# Patient Record
Sex: FEMALE | Race: WHITE | Employment: PART TIME | URBAN - METROPOLITAN AREA
[De-identification: names, ages, dates, MRNs, and addresses within clinical notes are randomized per-mention and may not be internally consistent; named-entity substitution may affect disease eponyms.]

---

## 2017-03-27 ENCOUNTER — OFFICE VISIT (OUTPATIENT)
Dept: FAMILY MEDICINE CLINIC | Facility: CLINIC | Age: 37
End: 2017-03-27

## 2017-03-27 VITALS
SYSTOLIC BLOOD PRESSURE: 122 MMHG | HEIGHT: 65 IN | WEIGHT: 229 LBS | HEART RATE: 81 BPM | TEMPERATURE: 98 F | DIASTOLIC BLOOD PRESSURE: 60 MMHG | BODY MASS INDEX: 38.15 KG/M2 | RESPIRATION RATE: 12 BRPM | OXYGEN SATURATION: 99 %

## 2017-03-27 DIAGNOSIS — I42.9 CARDIOMYOPATHY, UNSPECIFIED TYPE (HCC): ICD-10-CM

## 2017-03-27 DIAGNOSIS — Z00.00 ROUTINE ADULT HEALTH MAINTENANCE: Primary | ICD-10-CM

## 2017-03-27 DIAGNOSIS — L73.2 HYDRADENITIS: ICD-10-CM

## 2017-03-27 PROCEDURE — 99214 OFFICE O/P EST MOD 30 MIN: CPT | Performed by: FAMILY MEDICINE

## 2017-03-27 RX ORDER — CLINDAMYCIN PHOSPHATE 10 MG/ML
LOTION TOPICAL
Qty: 60 ML | Refills: 1 | Status: SHIPPED | OUTPATIENT
Start: 2017-03-27

## 2017-03-27 NOTE — PROGRESS NOTES
HPI:    Patient ID: Cornelia Prado is a 39year old female. HPI  Patient is here with complaint of her hidradenitis suppurativa starting to act up again in the armpits and below her breast and groin area. She wants to treated before it gets bad.   She has had side effects of them. She does not want stronger medication either. Will also check repeat echocardiogram is she had postpartum cardiomyopathy and had echo done at that time which showed abnormalities.   She feels fine and has dental procedure darci

## 2017-04-05 ENCOUNTER — LAB ENCOUNTER (OUTPATIENT)
Dept: LAB | Age: 37
End: 2017-04-05
Attending: FAMILY MEDICINE
Payer: MEDICAID

## 2017-04-05 DIAGNOSIS — Z00.00 ROUTINE ADULT HEALTH MAINTENANCE: ICD-10-CM

## 2017-04-05 PROCEDURE — 80053 COMPREHEN METABOLIC PANEL: CPT

## 2017-04-05 PROCEDURE — 85025 COMPLETE CBC W/AUTO DIFF WBC: CPT

## 2017-04-05 PROCEDURE — 84443 ASSAY THYROID STIM HORMONE: CPT

## 2017-04-05 PROCEDURE — 84481 FREE ASSAY (FT-3): CPT

## 2017-04-05 PROCEDURE — 84439 ASSAY OF FREE THYROXINE: CPT

## 2017-04-05 PROCEDURE — 80061 LIPID PANEL: CPT

## 2017-04-05 PROCEDURE — 36415 COLL VENOUS BLD VENIPUNCTURE: CPT

## 2017-04-13 ENCOUNTER — HOSPITAL ENCOUNTER (OUTPATIENT)
Dept: CV DIAGNOSTICS | Age: 37
Discharge: HOME | End: 2017-04-13
Attending: FAMILY MEDICINE
Payer: MEDICAID

## 2017-04-13 DIAGNOSIS — I42.9 CARDIOMYOPATHY, UNSPECIFIED TYPE (HCC): ICD-10-CM

## 2017-04-13 PROCEDURE — 93306 TTE W/DOPPLER COMPLETE: CPT

## 2017-04-13 PROCEDURE — 93306 TTE W/DOPPLER COMPLETE: CPT | Performed by: INTERNAL MEDICINE

## 2017-04-18 ENCOUNTER — OFFICE VISIT (OUTPATIENT)
Dept: FAMILY MEDICINE CLINIC | Facility: CLINIC | Age: 37
End: 2017-04-18

## 2017-04-18 VITALS
RESPIRATION RATE: 14 BRPM | SYSTOLIC BLOOD PRESSURE: 120 MMHG | OXYGEN SATURATION: 100 % | HEART RATE: 129 BPM | BODY MASS INDEX: 37 KG/M2 | DIASTOLIC BLOOD PRESSURE: 68 MMHG | WEIGHT: 222 LBS

## 2017-04-18 DIAGNOSIS — E78.00 HIGH CHOLESTEROL: ICD-10-CM

## 2017-04-18 DIAGNOSIS — L73.2 HIDRADENITIS SUPPURATIVA: Primary | ICD-10-CM

## 2017-04-18 DIAGNOSIS — D64.9 ANEMIA, UNSPECIFIED TYPE: ICD-10-CM

## 2017-04-18 PROCEDURE — 99214 OFFICE O/P EST MOD 30 MIN: CPT | Performed by: FAMILY MEDICINE

## 2017-04-18 RX ORDER — FLUCONAZOLE 150 MG/1
150 TABLET ORAL ONCE
Qty: 1 TABLET | Refills: 0 | Status: SHIPPED | OUTPATIENT
Start: 2017-04-18 | End: 2017-04-18

## 2017-04-18 RX ORDER — MINOCYCLINE HYDROCHLORIDE 100 MG/1
100 CAPSULE ORAL 2 TIMES DAILY
Qty: 28 CAPSULE | Refills: 0 | Status: SHIPPED | OUTPATIENT
Start: 2017-04-18 | End: 2017-05-02

## 2017-04-18 NOTE — PROGRESS NOTES
HPI:    Patient ID: Mary Ann Clark is a 39year old female. HPI  Patient is here with complaint of a flareup of the hidradenitis suppurativa in the right left armpit area which is tender and she noticed a cyst formation there underneath the skin.   Nor need to see surgeon to have surgical drainage. Labs reviewed showing hemoglobin slightly low at 10.7. This is consistent with menstrual blood loss with iron deficiency anemia. She will start iron medication over-the-counter once a day.   Recheck CBC and

## 2017-05-15 ENCOUNTER — HOSPITAL ENCOUNTER (EMERGENCY)
Age: 37
Discharge: HOME | End: 2017-05-15
Payer: MEDICAID

## 2017-05-15 ENCOUNTER — APPOINTMENT (OUTPATIENT)
Dept: GENERAL RADIOLOGY | Age: 37
End: 2017-05-15
Payer: MEDICAID

## 2017-05-15 VITALS
OXYGEN SATURATION: 98 % | HEIGHT: 66 IN | TEMPERATURE: 98 F | BODY MASS INDEX: 34.55 KG/M2 | HEART RATE: 69 BPM | RESPIRATION RATE: 16 BRPM | SYSTOLIC BLOOD PRESSURE: 119 MMHG | WEIGHT: 215 LBS | DIASTOLIC BLOOD PRESSURE: 83 MMHG

## 2017-05-15 DIAGNOSIS — S96.912A STRAIN OF FOOT, LEFT, INITIAL ENCOUNTER: Primary | ICD-10-CM

## 2017-05-15 PROCEDURE — 99283 EMERGENCY DEPT VISIT LOW MDM: CPT

## 2017-05-15 PROCEDURE — 73630 X-RAY EXAM OF FOOT: CPT

## 2017-05-15 RX ORDER — MINOCYCLINE HYDROCHLORIDE 50 MG/1
50 CAPSULE ORAL 2 TIMES DAILY
COMMUNITY

## 2017-05-15 NOTE — ED INITIAL ASSESSMENT (HPI)
States dropped jar of spaghetti sauce on left foot, now with pain to left lateral foot and 4th and 5th toes.  Pulses palpated

## 2017-05-15 NOTE — ED PROVIDER NOTES
Patient Seen in: THE [facility] Emergency Department In [place]    History   Patient presents with:  Lower Extremity Injury (musculoskeletal)    Stated Complaint: left foot injury     HPI    This is a 79-year-old female who dropped a jar of spaghetti sauce on 34.72 kg/m2  SpO2 98%  LMP 04/29/2017        Physical Exam  General: Female no respiratory distress  The patient is in no respiratory distress    HEENT: There is no signs of trauma. Oral mucosa is wet.     Lungs: Clear to auscultation without wheezing or r

## 2017-07-18 NOTE — ADDENDUM NOTE
Encounter addended by: Patricia Orozco on: 7/18/2017  7:56 AM<BR>    Actions taken: Letter status changed

## 2021-03-18 NOTE — LETTER
07/18/17        [address] [place] [id number]      Dear Gunnar Client records indicate that you have outstanding lab work and or testing that was ordered for you and has not yet been completed:          CBC, Platelet, No Pharmacist chart review completed for refill of xyandi indicates no medication or significant health changes since last pharmacist counseling. No questions for the pharmacist. Medication shipped to [facility] [id number]   via [employer] for delivery on 3/25.    Joyce Norris   Pharmacy Technician Specialist  [facility]  Phone: [phone]  [email or web address]

## 2023-04-21 NOTE — ADDENDUM NOTE
Addended by: MAGGY ATKINSON on: 4/21/2023 01:57 PM     Modules accepted: Orders     Addended by: Shobha Gomes on: 4/18/2017 05:26 PM     Modules accepted: [facility]

## (undated) NOTE — ED AVS SNAPSHOT
[facility] in [address] [facility]    Phone:  [phone]    Fax:  [phone] [facility]   MRN: [id number]    Department:  [facility] in [place]   Date of Visit coverage for follow-up care and referrals. [address] [facility] [place] [phone]  Pediatric [phone] Emergency Department   [phone]       To Check ER Wait Times:  TEXT 'ERwait' to 11425      Click [email or web address] will be contacted. Please make sure we have your correct phone number before you leave. After you leave, you should follow the attached instructions. I have read and understand the instructions given to me by my caregivers.         24-Hour Pharmacies XR FOOT, COMPLETE (MIN 3 VIEWS), LEFT (CPT=73630) (Final result) Result time:  05/15/17 10:47:28    Final result    Impression:    PROCEDURE:  XR FOOT, COMPLETE (MIN 3 VIEWS), LEFT (CPT=73630)     TECHNIQUE:  AP, oblique, and lateral views were obtained.

## (undated) NOTE — MR AVS SNAPSHOT
[facility] [address] [place] [address] [address] [facility]  [address] [phone]               Thank you for choosing us for your health care visit with Keiry Azul MD.  We are glad to serve you and happy to provide you wit Take 1 tablet (150 mg total) by mouth once. Commonly known as:  DIFLUCAN           Minocycline HCl 100 MG Caps   Take 1 capsule (100 mg total) by mouth 2 (two) times daily.    Commonly known as:  MINOCIN,DYNACIN                Where to Get Your Medication

## (undated) NOTE — MR AVS SNAPSHOT
[facility] [address] [place] [address] [address] [facility]  [phone]               Thank you for choosing us for your health care visit with Alexandra Linda MD.  We are glad to serve you and happy to provide you wit to obtain this authorization for your ordered radiology test.    To schedule an appointment for your radiology test please call Terry Gudino Scheduling   at [phone].          Reason for Today's Visit     Rash           Medical Issues Discuss Make half your plate fruits and vegetables Highly refined, white starches including white bread, rice and pasta   Eat plenty of protein, keep the fat content low Sugars:  sodas and sports drinks, candies and desserts   Eat plenty of low-fat dairy products

## (undated) NOTE — ED AVS SNAPSHOT
[facility] Emergency Department in [address] [facility]    Phone:  [phone]    Fax:  [phone]   MRN: [id number]    Department:  [facility] Emergency Department in [place]   Date of Visit IF THERE IS ANY CHANGE OR WORSENING OF YOUR CONDITION, CALL YOUR PRIMARY CARE PHYSICIAN AT ONCE OR RETURN IMMEDIATELY TO THE EMERGENCY DEPARTMENT.     If you have been prescribed any medication(s), please fill your prescription right away and begin taking t

## (undated) NOTE — Clinical Note
Date: 4/18/2017    Patient Name: Minda Tang          To Whom it may concern:      Patient is medically cleared for her dental procedure.          Sincerely,    Gerhard Kessler MD